# Patient Record
(demographics unavailable — no encounter records)

---

## 2024-11-18 NOTE — PHYSICAL EXAM
[Well Developed] : well developed [Well Nourished] : well nourished [No Acute Distress] : no acute distress [Normal Conjunctiva] : normal conjunctiva [Normal Venous Pressure] : normal venous pressure [Normal S1, S2] : normal S1, S2 [No Respiratory Distress] : no respiratory distress  [Normal Gait] : normal gait [No Edema] : no edema [No Cyanosis] : no cyanosis [No Clubbing] : no clubbing [No Rash] : no rash [Moves all extremities] : moves all extremities [No Focal Deficits] : no focal deficits [Normal Speech] : normal speech [Alert and Oriented] : alert and oriented

## 2024-11-20 NOTE — HISTORY OF PRESENT ILLNESS
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     Recently he has been feeling well. he denies palpitation, dyspnea or dizziness/syncope.  He has a history of sinus bradycardia, first degree AVB, and paroxysmal AF and an ILR was implanted in 2020. He underwent AF ablation on 4/9/21 (PVI, PWI, and CTI ablation) at [facility] with Dr Richi Pike.  Following the ablation he did well for the first year, and then was found to have episodes of recurrent pAF on followup.   Recently on ILR he has had episodes of pAF lasting up to 3.5 hours with rates up to 130s bpm(previously up to 31 hours), with overall AT/AF burden 1.4% (last visit burden was 0.4%). No significant bradycardia has been noted.   He is taking Toprol 25mg qd and ASA 81mg qd.  On recent followup with Dr Rouse he was in AF, and 11/04/2024 ECG revealed course AF 58 bpm He denies significant symptoms with the AF, but is concerned about the frequent episodes and progressive arrhythmia.  Overall he is doing well and denies palpitation, dizziness, SOB, syncope or near syncope.   He had difficulty with anticoagulation mostly with regard to requiring frequent injections for his spinal disease, which have required interruptions in anticoagulation, and interruptions in getting his injections due to anticoagulation. He therefore elected to proceed with LAAO as an alternative to long-term anticoagulation.   On 12/11/23 he underwent successful DANIELA with a 28mm Amulet device. He is now on ASA 81mg qd alone.   A followup JAYLON 3/18/24 revealed a well seated Amulet with a small 2mm leak noted in the high angle view, and no DRT. (*plan to stop plavix)  ECG today reveals sinus SR, 74 bpm   Current meds include ASA 81, Toprol 25, losartan 100, Crestor, metformin

## 2024-11-20 NOTE — CARDIOLOGY SUMMARY
[de-identified] : 11/20/2024: SR, 74 bpm  11/04/2024: AF 58 bpm 6/12/24 sinus rhythm 69 bpm, narrow QRS,  ms 1/10/24 sinus rhythm 74 bpm, narrow QRS 11/8/23  sinus rhythm 78 bpm, first degree AVB  ms, narrow QRS [de-identified] : Stress 10/11/23 LVEF 69%, no ischemia or infarct   [de-identified] : 11/04/2024 TTE EF 65%, Mild MR  JAYLON 3/18/24  1. Left ventricular systolic function is normal.  2. Normal right ventricular cavity size and normal systolic function.  3. The left atrium is enlarged.  4. The right atrium is normal in size.  5. The ascending aorta and aortic root appear normal in size.  6. An Amulet device is present at the base of the left atrial appendage. The left atrial appendage closure device appears to be positioned normally. Residual flow noted. Leak present: 2 mm seen at 135 degrees. There is no device related thrombus seen.  7. Iatrogenic interatrial shunt is noted with primarily predominately left to right shunting by color doppler.  8. No left atrial thrombus.  9. Trileaflet aortic valve with normal systolic excursion. TTE 10/9/23 LVEF 61%, LA 3.5cm, trace AI, trace MR, trace TR, RVSP <25   [de-identified] : CTA 2006 normal coronaries [de-identified] : Renal US 10/25/23 normal renal duplex

## 2024-11-20 NOTE — DISCUSSION/SUMMARY
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     He continues to have episodes of recurrent paroxysmal AF following his ablation in 2021. He has had episodes lasting several hours, and AF burden that has been gradually increasing. At this point the patient is not significantly burdened by symptoms, but has expressed interest in pursuing additional treatment to prevent further progression of AF and associated morbidities.  We discussed management options including continued rate control, antiarrhythmic medications, and AF ablation.  We discussed the risks and benefits of AF ablation in detail, including procedure related risks such as bleeding, vascular injury, cardiac perforation, stroke and esophageal injury. He expressed understanding and does wish to proceed with the repeat AF ablation.  He continues to do well following his LAAO procedure and has a 1 year post operative JAYLON scheduled for December. He will require resumption of anticoagulation (Xarelto) for the major-ablation period- will restart Xarelto 20mg qhs 1 week before ablation, and continue for 2 months after.  Recommendations:  -repeat JAYLON 1 yr post LAAO - plan in 12/2024 - AF Ablation. Restart Xarelto 1 week prior (including evening before ablation) and continue 2 months post ablation.  -can stop ASA when on Xarelto -continue ILR monitoring. -EP follow up after above or prn

## 2024-11-20 NOTE — CARDIOLOGY SUMMARY
[de-identified] : 11/20/2024: SR, 74 bpm  11/04/2024: AF 58 bpm 6/12/24 sinus rhythm 69 bpm, narrow QRS,  ms 1/10/24 sinus rhythm 74 bpm, narrow QRS 11/8/23  sinus rhythm 78 bpm, first degree AVB  ms, narrow QRS [de-identified] : Stress 10/11/23 LVEF 69%, no ischemia or infarct   [de-identified] : 11/04/2024 TTE EF 65%, Mild MR  JAYLON 3/18/24  1. Left ventricular systolic function is normal.  2. Normal right ventricular cavity size and normal systolic function.  3. The left atrium is enlarged.  4. The right atrium is normal in size.  5. The ascending aorta and aortic root appear normal in size.  6. An Amulet device is present at the base of the left atrial appendage. The left atrial appendage closure device appears to be positioned normally. Residual flow noted. Leak present: 2 mm seen at 135 degrees. There is no device related thrombus seen.  7. Iatrogenic interatrial shunt is noted with primarily predominately left to right shunting by color doppler.  8. No left atrial thrombus.  9. Trileaflet aortic valve with normal systolic excursion. TTE 10/9/23 LVEF 61%, LA 3.5cm, trace AI, trace MR, trace TR, RVSP <25   [de-identified] : CTA 2006 normal coronaries [de-identified] : Renal US 10/25/23 normal renal duplex

## 2024-11-20 NOTE — HISTORY OF PRESENT ILLNESS
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     Recently he has been feeling well. he denies palpitation, dyspnea or dizziness/syncope.  He has a history of sinus bradycardia, first degree AVB, and paroxysmal AF and an ILR was implanted in 2020. He underwent AF ablation on 4/9/21 (PVI, PWI, and CTI ablation) at [facility] with Dr Richi Pike.  Following the ablation he did well for the first year, and then was found to have episodes of recurrent pAF on followup.   Recently on ILR he has had episodes of pAF lasting up to 3.5 hours with rates up to 130s bpm(previously up to 31 hours), with overall AT/AF burden 1.4% (last visit burden was 0.4%). No significant bradycardia has been noted.   He is taking Toprol 25mg qd and ASA 81mg qd.  On recent followup with Dr Rouse he was in AF, and 11/04/2024 ECG revealed course AF 58 bpm He denies significant symptoms with the AF, but is concerned about the frequent episodes and progressive arrhythmia.  Overall he is doing well and denies palpitation, dizziness, SOB, syncope or near syncope.   He had difficulty with anticoagulation mostly with regard to requiring frequent injections for his spinal disease, which have required interruptions in anticoagulation, and interruptions in getting his injections due to anticoagulation. He therefore elected to proceed with LAAO as an alternative to long-term anticoagulation.   On 12/11/23 he underwent successful DANIELA with a 28mm Amulet device. He is now on ASA 81mg qd alone.   A followup JAYLON 3/18/24 revealed a well seated Amulet with a small 2mm leak noted in the high angle view, and no DRT. (*plan to stop plavix)  ECG today reveals sinus SR, 74 bpm   Current meds include ASA 81, Toprol 25, losartan 100, Crestor, metformin

## 2024-11-20 NOTE — REASON FOR VISIT
[Arrhythmia/ECG Abnorrmalities] : arrhythmia/ECG abnormalities [Friend] : friend [FreeTextEntry3] : Dr Rouse

## 2024-11-20 NOTE — END OF VISIT
[Time Spent: ___ minutes] : I have spent [unfilled] minutes of time on the encounter which excludes teaching and separately reported services. [FreeTextEntry4] :  I, Angel Waite, am scribing for and in the presence of  in the following sections HISTORY OF PRESENT ILLNESSS, PAST MEDICAL/FAMILY/SOCIAL HISTORY; REVIEW OF SYSTEMS; VITAL SIGNS; PHYSICAL EXAM; ASSESSMENT/PLAN [FreeTextEntry3] : I, Montrell Pike, personally performed the services described in this documentation. All medical record entries made by the scribe were at my direction and in my presence. I have reviewed the chart and agree that the record reflects my personal performance and is accurate and complete.            I was present for the above evaluation and agree with the history, physical examination, assessment and plan as above.

## 2024-11-20 NOTE — DISCUSSION/SUMMARY
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     He continues to have episodes of recurrent paroxysmal AF following his ablation in 2021. He has had episodes lasting several hours, and AF burden that has been gradually increasing. At this point the patient is not significantly burdened by symptoms, but has expressed interest in pursuing additional treatment to prevent further progression of AF and associated morbidities.  We discussed management options including continued rate control, antiarrhythmic medications, and AF ablation.  We discussed the risks and benefits of AF ablation in detail, including procedure related risks such as bleeding, vascular injury, cardiac perforation, stroke and esophageal injury. He expressed understanding and does wish to proceed with the repeat AF ablation.  He continues to do well following his LAAO procedure and has a 1 year post operative JAYLON scheduled for December. He will require resumption of anticoagulation (Xarelto) for the major-ablation period- will restart Xarelto 20mg qhs 1 week before ablation, and continue for 2 months after.  Recommendations:  -repeat JAYLON 1 yr post LAAO - plan in 12/2024 - AF Ablation. Restart Xarelto 1 week prior (including evening before ablation) and continue 2 months post ablation.  -can stop ASA when on Xarelto -continue ILR monitoring. -EP follow up after above or prn

## 2025-02-25 NOTE — PHYSICAL EXAM
[Well Developed] : well developed [Well Nourished] : well nourished [No Acute Distress] : no acute distress [Normal Conjunctiva] : normal conjunctiva [Normal S1, S2] : normal S1, S2 [No Respiratory Distress] : no respiratory distress  [Normal Gait] : normal gait [No Edema] : no edema [No Cyanosis] : no cyanosis [No Clubbing] : no clubbing [No Rash] : no rash [Moves all extremities] : moves all extremities [No Focal Deficits] : no focal deficits [Normal Speech] : normal speech [Alert and Oriented] : alert and oriented

## 2025-02-26 NOTE — CARDIOLOGY SUMMARY
[de-identified] : 2/26/25: SB 58bpm, first degree AV block 11/20/2024: SR, 74 bpm  11/04/2024: AF 58 bpm 6/12/24 sinus rhythm 69 bpm, narrow QRS,  ms 1/10/24 sinus rhythm 74 bpm, narrow QRS 11/8/23  sinus rhythm 78 bpm, first degree AVB  ms, narrow QRS [de-identified] : Stress 10/11/23 LVEF 69%, no ischemia or infarct   [de-identified] : 12/16/24 JAYLON: LVEF 60-65%, mild MR, trace TR, device positioned normally, leak present 2.0mm seen at 125 degrees, no device related thrombus. 11/04/2024 TTE EF 65%, Mild MR  JAYLON 3/18/24  1. Left ventricular systolic function is normal.  2. Normal right ventricular cavity size and normal systolic function.  3. The left atrium is enlarged.  4. The right atrium is normal in size.  5. The ascending aorta and aortic root appear normal in size.  6. An Amulet device is present at the base of the left atrial appendage. The left atrial appendage closure device appears to be positioned normally. Residual flow noted. Leak present: 2 mm seen at 135 degrees. There is no device related thrombus seen.  7. Iatrogenic interatrial shunt is noted with primarily predominately left to right shunting by color doppler.  8. No left atrial thrombus.  9. Trileaflet aortic valve with normal systolic excursion. TTE 10/9/23 LVEF 61%, LA 3.5cm, trace AI, trace MR, trace TR, RVSP <25   [de-identified] : CTA 2006 normal coronaries [de-identified] : Renal US 10/25/23 normal renal duplex

## 2025-02-26 NOTE — CARDIOLOGY SUMMARY
[de-identified] : 2/26/25: SB 58bpm, first degree AV block 11/20/2024: SR, 74 bpm  11/04/2024: AF 58 bpm 6/12/24 sinus rhythm 69 bpm, narrow QRS,  ms 1/10/24 sinus rhythm 74 bpm, narrow QRS 11/8/23  sinus rhythm 78 bpm, first degree AVB  ms, narrow QRS [de-identified] : Stress 10/11/23 LVEF 69%, no ischemia or infarct   [de-identified] : 12/16/24 JAYLON: LVEF 60-65%, mild MR, trace TR, device positioned normally, leak present 2.0mm seen at 125 degrees, no device related thrombus. 11/04/2024 TTE EF 65%, Mild MR  JAYLON 3/18/24  1. Left ventricular systolic function is normal.  2. Normal right ventricular cavity size and normal systolic function.  3. The left atrium is enlarged.  4. The right atrium is normal in size.  5. The ascending aorta and aortic root appear normal in size.  6. An Amulet device is present at the base of the left atrial appendage. The left atrial appendage closure device appears to be positioned normally. Residual flow noted. Leak present: 2 mm seen at 135 degrees. There is no device related thrombus seen.  7. Iatrogenic interatrial shunt is noted with primarily predominately left to right shunting by color doppler.  8. No left atrial thrombus.  9. Trileaflet aortic valve with normal systolic excursion. TTE 10/9/23 LVEF 61%, LA 3.5cm, trace AI, trace MR, trace TR, RVSP <25   [de-identified] : CTA 2006 normal coronaries [de-identified] : Renal US 10/25/23 normal renal duplex

## 2025-02-26 NOTE — HISTORY OF PRESENT ILLNESS
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   He has a history of sinus bradycardia, first degree AVB, and paroxysmal AF and an ILR was implanted in 2020. He underwent AF ablation on 4/9/21 (PVI, PWI, and CTI ablation) at [facility] with Dr Richi Pike.  Following the ablation he did well for the first year, and then was found to have episodes of recurrent pAF on followup.   Recently on ILR he has had episodes of pAF lasting up to 3.5 hours with rates up to 130s bpm(previously up to 31 hours), with overall AT/AF burden 1.4% (last visit burden was 0.4%). No significant bradycardia has been noted.   He is taking Toprol 25mg qd and ASA 81mg qd (off anticoagulation sine LAAO procedure).  On recent followup with Dr Rouse he was in AF, and 11/04/2024 ECG revealed course AF 58 bpm He denies significant symptoms with the AF, but is concerned about the frequent episodes and progressive arrhythmia. At last visit an AF ablation was planned.  Overall he is doing well and denies palpitation, dizziness, SOB, syncope or near syncope.   He had difficulty with anticoagulation mostly with regard to requiring frequent injections for his spinal disease, which have required interruptions in anticoagulation, and interruptions in getting his injections due to anticoagulation. He therefore elected to proceed with LAAO as an alternative to long-term anticoagulation.  On 12/11/23 he underwent successful LAAO with a 28mm Amulet device. He is now on ASA 81mg qd alone.   A followup JAYLON 3/18/24 revealed a well seated Amulet with a small 2mm leak noted in the high angle view, and no DRT.  Subsequent JAYLON 12/16/24 revealed LVEF 60-65%, mild MR, trace TR, device positioned normally, small 2mm leak noted in the high angle view.   Repeat AF ablation was discussed at previous visit, it has since been scheduled for 4/29/25. He feels well overall and denies palpitations, dizziness, SOB, syncope or near syncope.  ECG today reveals SB 58bpm, first degree AV block  Interrogation of ILR reveals 12 AF episodes since November 2024 with events lasting up to 1 hour, AF burden 0.3%, sensitivity 0.035mV, PVC burden 0%.   Current meds include ASA 81, Toprol 25, losartan 100, Crestor, metformin

## 2025-02-26 NOTE — END OF VISIT
[Time Spent: ___ minutes] : I have spent [unfilled] minutes of time on the encounter which excludes teaching and separately reported services. [FreeTextEntry4] :  I, Magi Florentino, am scribing for and in the presence of  in the following sections HISTORY OF PRESENT ILLNESSS, PAST MEDICAL/FAMILY/SOCIAL HISTORY; REVIEW OF SYSTEMS; VITAL SIGNS; PHYSICAL EXAM; ASSESSMENT/PLAN   [FreeTextEntry3] : I, Montrell Pike, personally performed the services described in this documentation. All medical record entries made by the scribe were at my direction and in my presence. I have reviewed the chart and agree that the record reflects my personal performance and is accurate and complete.   I was present for the above evaluation and agree with the history, physical examination, assessment and plan as above.

## 2025-02-26 NOTE — DISCUSSION/SUMMARY
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     He continues to have episodes of recurrent paroxysmal AF following his ablation in 2021. He has had episodes lasting up to an hour. At this point the patient is not significantly burdened by symptoms, but is concerned about progressive AF and associated morbidity, and has elected to undergo repeat ablation.  We again discussed management options including continued rate control, antiarrhythmic medications, and AF ablation. We discussed the risks and benefits of AF ablation in detail, including procedure related risks such as bleeding, vascular injury, cardiac perforation, stroke and esophageal injury. He expressed understanding and does wish to proceed with the repeat AF ablation.  He will require resumption of anticoagulation (Xarelto) for the major-ablation period- will restart Xarelto 20mg qhs 1 week before ablation, and continue for 2 months after.  Recommendations:  -Repeat AF ablation scheduled for 4/29/25. Restart Xarelto 1 week prior (including evening before ablation) and continue 2 months post ablation.  -can stop ASA when on Xarelto. -Continue ILR monitoring. -EP follow up after above or prn

## 2025-02-26 NOTE — DISCUSSION/SUMMARY
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     He continues to have episodes of recurrent paroxysmal AF following his ablation in 2021. He has had episodes lasting up to an hour. At this point the patient is not significantly burdened by symptoms, but is concerned about progressive AF and associated morbidity, and has elected to undergo repeat ablation.  We again discussed management options including continued rate control, antiarrhythmic medications, and AF ablation. We discussed the risks and benefits of AF ablation in detail, including procedure related risks such as bleeding, vascular injury, cardiac perforation, stroke and esophageal injury. He expressed understanding and does wish to proceed with the repeat AF ablation.  He will require resumption of anticoagulation (Xarelto) for the major-ablation period- will restart Xarelto 20mg qhs 1 week before ablation, and continue for 2 months after.  Recommendations:  -Repeat AF ablation scheduled for 4/29/25. Restart Xarelto 1 week prior (including evening before ablation) and continue 2 months post ablation.  -can stop ASA when on Xarelto. -Continue ILR monitoring. -EP follow up after above or prn

## 2025-02-26 NOTE — HISTORY OF PRESENT ILLNESS
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   He has a history of sinus bradycardia, first degree AVB, and paroxysmal AF and an ILR was implanted in 2020. He underwent AF ablation on 4/9/21 (PVI, PWI, and CTI ablation) at [facility] with Dr Richi Pike.  Following the ablation he did well for the first year, and then was found to have episodes of recurrent pAF on followup.   Recently on ILR he has had episodes of pAF lasting up to 3.5 hours with rates up to 130s bpm(previously up to 31 hours), with overall AT/AF burden 1.4% (last visit burden was 0.4%). No significant bradycardia has been noted.   He is taking Toprol 25mg qd and ASA 81mg qd (off anticoagulation sine LAAO procedure).  On recent followup with Dr Rouse he was in AF, and 11/04/2024 ECG revealed course AF 58 bpm He denies significant symptoms with the AF, but is concerned about the frequent episodes and progressive arrhythmia. At last visit an AF ablation was planned.  Overall he is doing well and denies palpitation, dizziness, SOB, syncope or near syncope.   He had difficulty with anticoagulation mostly with regard to requiring frequent injections for his spinal disease, which have required interruptions in anticoagulation, and interruptions in getting his injections due to anticoagulation. He therefore elected to proceed with LAAO as an alternative to long-term anticoagulation.  On 12/11/23 he underwent successful LAAO with a 28mm Amulet device. He is now on ASA 81mg qd alone.   A followup JAYLON 3/18/24 revealed a well seated Amulet with a small 2mm leak noted in the high angle view, and no DRT.  Subsequent JAYLON 12/16/24 revealed LVEF 60-65%, mild MR, trace TR, device positioned normally, small 2mm leak noted in the high angle view.   Repeat AF ablation was discussed at previous visit, it has since been scheduled for 4/29/25. He feels well overall and denies palpitations, dizziness, SOB, syncope or near syncope.  ECG today reveals SB 58bpm, first degree AV block  Interrogation of ILR reveals 12 AF episodes since November 2024 with events lasting up to 1 hour, AF burden 0.3%, sensitivity 0.035mV, PVC burden 0%.   Current meds include ASA 81, Toprol 25, losartan 100, Crestor, metformin

## 2025-02-26 NOTE — REVIEW OF SYSTEMS
[Negative] : Psychiatric [SOB] : no shortness of breath [Dyspnea on exertion] : not dyspnea during exertion [Chest Discomfort] : no chest discomfort [Lower Ext Edema] : no extremity edema [Leg Claudication] : no intermittent leg claudication [Palpitations] : no palpitations [Syncope] : no syncope [Dizziness] : no dizziness [Convulsions] : no convulsions

## 2025-06-04 NOTE — DISCUSSION/SUMMARY
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   Mr. Renee presented to the office today s/p most recent AF ablation on 4/29/25. Since the procedure he reports to be feeling well. He denies any arrhythmia symptoms since the procedure. His ILR was interrogated in office today, which revealed no new AF events or other arrhythmias. He has been maintained on Xarelto 20 mg prior to the procedure and will continue 2 months post procedure. Will continue ASA 81mg thereafter since  Amulet procedure in 2023. Groin access sites are healed well. He has been able to return to his daily activities and physical exercise without restrictions. He is maintaining NSR on EKG today.   Recommendations:  -Continue Xarleto 20mg QD until 6/29/25, then continue ASA 81mg only.  -Continue ILR monitoring. -Routine Cardiology f/u with Dr. Rouse -EP follow up annually or sooner PRN

## 2025-06-04 NOTE — CARDIOLOGY SUMMARY
[de-identified] : 6/5/25: SR HR 66 bpm, first degree AV block  2/26/25: SB 58bpm, first degree AV block 11/20/2024: SR, 74 bpm  11/04/2024: AF 58 bpm 6/12/24 sinus rhythm 69 bpm, narrow QRS,  ms 1/10/24 sinus rhythm 74 bpm, narrow QRS 11/8/23  sinus rhythm 78 bpm, first degree AVB  ms, narrow QRS [de-identified] : Stress 10/11/23 LVEF 69%, no ischemia or infarct   [de-identified] : 12/16/24 JAYLON: LVEF 60-65%, mild MR, trace TR, device positioned normally, leak present 2.0mm seen at 125 degrees, no device related thrombus. 11/04/2024 TTE EF 65%, Mild MR  JAYLON 3/18/24  1. Left ventricular systolic function is normal.  2. Normal right ventricular cavity size and normal systolic function.  3. The left atrium is enlarged.  4. The right atrium is normal in size.  5. The ascending aorta and aortic root appear normal in size.  6. An Amulet device is present at the base of the left atrial appendage. The left atrial appendage closure device appears to be positioned normally. Residual flow noted. Leak present: 2 mm seen at 135 degrees. There is no device related thrombus seen.  7. Iatrogenic interatrial shunt is noted with primarily predominately left to right shunting by color doppler.  8. No left atrial thrombus.  9. Trileaflet aortic valve with normal systolic excursion. TTE 10/9/23 LVEF 61%, LA 3.5cm, trace AI, trace MR, trace TR, RVSP <25   [de-identified] : CTA 2006 normal coronaries [de-identified] : Renal US 10/25/23 normal renal duplex

## 2025-06-04 NOTE — HISTORY OF PRESENT ILLNESS
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   He has a history of sinus bradycardia, first degree AVB, and paroxysmal AF and an ILR was implanted in 2020. He underwent AF ablation on 4/9/21 (PVI, PWI, and CTI ablation) at [facility] with Dr Richi Pike.  Following the ablation he did well for the first year, and then was found to have episodes of recurrent pAF on followup.  Subsequently on ILR he has had episodes of pAF lasting up to 3.5 hours with rates up to 130s bpm(previously up to 31 hours), with overall AT/AF burden 1.4%. He was taking Toprol 25mg qd and ASA 81mg qd (off anticoagulation sine LAAO procedure).   On 4/29/25 He underwent subsequent AF ablation (FARAPULSE pulsed field ablation of atrial fibrillation with re-isolation of PVI, SVC isolation). Post procedure he had mild bleeding at the incision site which resolved with manual compression.  Today he is doing well overall following his ablation and denies palpitation, dizziness, SOB, syncope or near syncope. He continues to wear smart watch as well for alerts, which he has not had received. He has continued Xarelto 20mg before the procedure and will continue for two months post procedure.   Interrogation of ILR reveals no new AF recorded events since 4/21/25, since ablation there has been no AF events. PVC burden in 0%.   B/L groin sites are healed, well approximated without erythema, edema, drainage, exudate or signs of infection.   He has done well following LAAO.  A followup JAYLON 3/18/24 revealed a well seated Amulet with a small 2mm leak noted in the high angle view, and no DRT.  Subsequent JAYLON 12/16/24 revealed LVEF 60-65%, mild MR, trace TR, device positioned normally, small 2mm leak noted in the high angle view.   ECG today reveals SR HR 66 bpm   Current meds include ASA 81, Toprol 25, losartan 100, Crestor, metformin

## 2025-06-04 NOTE — PHYSICAL EXAM
[Well Developed] : well developed [Well Nourished] : well nourished [No Acute Distress] : no acute distress [Normal Conjunctiva] : normal conjunctiva [Normal S1, S2] : normal S1, S2 [No Respiratory Distress] : no respiratory distress  [Normal Gait] : normal gait [No Edema] : no edema [No Cyanosis] : no cyanosis [No Clubbing] : no clubbing [No Rash] : no rash [Moves all extremities] : moves all extremities [No Focal Deficits] : no focal deficits [Normal Speech] : normal speech [Alert and Oriented] : alert and oriented [de-identified] : B/L groin sites are healed well.

## 2025-06-04 NOTE — DISCUSSION/SUMMARY
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   Mr. Renee presented to the office today s/p most recent AF ablation on 4/29/25. Since the procedure he reports to be feeling well. He denies any arrhythmia symptoms since the procedure. His ILR was interrogated in office today, which revealed no new AF events or other arrhythmias. He has been maintained on Xarelto 20 mg prior to the procedure and will continue 2 months post procedure. Will continue ASA 81mg thereafter since  Amulet procedure in 2023. Groin access sites are healed well. He has been able to return to his daily activities and physical exercise without restrictions. He is maintaining NSR on EKG today.   Recommendations:  -Continue Xarleto 20mg QD until 6/29/25, then continue ASA 81mg only.  -Continue ILR monitoring. -Routine Cardiology f/u with Dr. Rouse -EP follow up annually or sooner PRN

## 2025-06-04 NOTE — CARDIOLOGY SUMMARY
[de-identified] : 6/5/25: SR HR 66 bpm, first degree AV block  2/26/25: SB 58bpm, first degree AV block 11/20/2024: SR, 74 bpm  11/04/2024: AF 58 bpm 6/12/24 sinus rhythm 69 bpm, narrow QRS,  ms 1/10/24 sinus rhythm 74 bpm, narrow QRS 11/8/23  sinus rhythm 78 bpm, first degree AVB  ms, narrow QRS [de-identified] : Stress 10/11/23 LVEF 69%, no ischemia or infarct   [de-identified] : 12/16/24 JAYLON: LVEF 60-65%, mild MR, trace TR, device positioned normally, leak present 2.0mm seen at 125 degrees, no device related thrombus. 11/04/2024 TTE EF 65%, Mild MR  JAYLON 3/18/24  1. Left ventricular systolic function is normal.  2. Normal right ventricular cavity size and normal systolic function.  3. The left atrium is enlarged.  4. The right atrium is normal in size.  5. The ascending aorta and aortic root appear normal in size.  6. An Amulet device is present at the base of the left atrial appendage. The left atrial appendage closure device appears to be positioned normally. Residual flow noted. Leak present: 2 mm seen at 135 degrees. There is no device related thrombus seen.  7. Iatrogenic interatrial shunt is noted with primarily predominately left to right shunting by color doppler.  8. No left atrial thrombus.  9. Trileaflet aortic valve with normal systolic excursion. TTE 10/9/23 LVEF 61%, LA 3.5cm, trace AI, trace MR, trace TR, RVSP <25   [de-identified] : CTA 2006 normal coronaries [de-identified] : Renal US 10/25/23 normal renal duplex

## 2025-06-04 NOTE — END OF VISIT
[FreeTextEntry3] :  I, Dr. Montrell Pike, personally performed the evaluation and management (E/M) services for this established patient who presents today with an existing medical condition.  That E/M includes conducting the examination, assessing all new and exacerbated conditions, and establishing a new plan of care.  Today, my ACP, Janet Rodriguez [facility], was here to observe my evaluation and management services for this condition to be followed going forward.

## 2025-06-04 NOTE — REVIEW OF SYSTEMS
[Negative] : Psychiatric [Feeling Fatigued] : not feeling fatigued [SOB] : no shortness of breath [Dyspnea on exertion] : not dyspnea during exertion [Chest Discomfort] : no chest discomfort [Lower Ext Edema] : no extremity edema [Leg Claudication] : no intermittent leg claudication [Palpitations] : no palpitations [Syncope] : no syncope [Rash] : no rash [Dizziness] : no dizziness [Convulsions] : no convulsions [Easy Bleeding] : no tendency for easy bleeding [Easy Bruising] : no tendency for easy bruising [FreeTextEntry5] : see HPI

## 2025-06-04 NOTE — PHYSICAL EXAM
[Well Developed] : well developed [Well Nourished] : well nourished [No Acute Distress] : no acute distress [Normal Conjunctiva] : normal conjunctiva [Normal S1, S2] : normal S1, S2 [No Respiratory Distress] : no respiratory distress  [Normal Gait] : normal gait [No Edema] : no edema [No Cyanosis] : no cyanosis [No Clubbing] : no clubbing [No Rash] : no rash [Moves all extremities] : moves all extremities [No Focal Deficits] : no focal deficits [Normal Speech] : normal speech [Alert and Oriented] : alert and oriented [de-identified] : B/L groin sites are healed well.

## 2025-06-04 NOTE — END OF VISIT
[FreeTextEntry3] :  I, Dr. Montrell Pike, personally performed the evaluation and management (E/M) services for this established patient who presents today with an existing medical condition.  That E/M includes conducting the examination, assessing all new and exacerbated conditions, and establishing a new plan of care.  Today, my ACP, Janet Rodriguez [facility], was here to observe my evaluation and management services for this condition to be followed going forward.